# Patient Record
Sex: MALE | Race: WHITE
[De-identification: names, ages, dates, MRNs, and addresses within clinical notes are randomized per-mention and may not be internally consistent; named-entity substitution may affect disease eponyms.]

---

## 2018-03-17 ENCOUNTER — HOSPITAL ENCOUNTER (EMERGENCY)
Dept: HOSPITAL 17 - PHED | Age: 33
LOS: 1 days | Discharge: HOME | End: 2018-03-18
Payer: COMMERCIAL

## 2018-03-17 VITALS
SYSTOLIC BLOOD PRESSURE: 135 MMHG | RESPIRATION RATE: 18 BRPM | OXYGEN SATURATION: 96 % | TEMPERATURE: 98.9 F | DIASTOLIC BLOOD PRESSURE: 86 MMHG | HEART RATE: 120 BPM

## 2018-03-17 VITALS — BODY MASS INDEX: 24.19 KG/M2 | HEIGHT: 72 IN | WEIGHT: 178.57 LBS

## 2018-03-17 DIAGNOSIS — S61.211A: Primary | ICD-10-CM
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
## 2018-03-17 PROCEDURE — 12001 RPR S/N/AX/GEN/TRNK 2.5CM/<: CPT

## 2018-03-18 VITALS — RESPIRATION RATE: 16 BRPM | OXYGEN SATURATION: 95 % | HEART RATE: 90 BPM

## 2018-03-18 VITALS — DIASTOLIC BLOOD PRESSURE: 80 MMHG | SYSTOLIC BLOOD PRESSURE: 130 MMHG

## 2018-03-18 NOTE — PD
HPI


Chief Complaint:  Laceration/Skin Injury


Time Seen by Provider:  01:03


Travel History


International Travel<30 days:  No


Contact w/Intl Traveler<30days:  No


Traveled to known affect area:  No





History of Present Illness


HPI


32-year-old male presents to the emergency department by private transportation 

for injury to his left index finger.  Patient is left-handed.  Patient states 

tetanus status is current.  Patient states just prior to arrival to the 

emergency department within the last 3 hours while using a Brillo pad at work 

to clean the table he was cut by the metal on the Brillo pad sustaining a 

laceration to the proximal left index finger just distal to the second MTP on 

the palmar aspect.  Bleeding is controlled.  Patient denies any numbness 

tingling or weakness of the digit denies any tendon injury.  Patient has intact 

flexion and extension.  The patient rates his pain 0/10 intensity.





PFSH


Past Medical History


*** Narrative Medical


Negative past medical history negative surgical history occasional alcohol use; 

nursing notes reviewed


Medical History:  Denies Significant Hx


Tetanus Vaccination:  < 5 Years


Influenza Vaccination:  Yes





Past Surgical History


Surgical History:  No Previous Surgery





Social History


Alcohol Use:  Yes


Tobacco Use:  No


Substance Use:  No





Allergies-Medications


(Allergen,Severity, Reaction):  


Coded Allergies:  


     No Known Allergies (Unverified  Adverse Reaction, Unknown, 3/18/18)


Reported Meds & Prescriptions





Reported Meds & Active Scripts


Active


No Active Prescriptions or Reported Medications    








Review of Systems


Except as stated in HPI:  all other systems reviewed are Neg





Physical Exam


Narrative


General: Well-developed well-nourished male no acute distress or respiratory 

distress


Extremity attention left hand palmar aspect just distal to the flexor surface 

of the second MCP capillary refill less than 2 seconds sensation intact digit 

is neurovascular tendon intact with intact flexion and extension at the DIP PIP 

and MCP.





Data


Data


Last Documented VS





Vital Signs








  Date Time  Temp Pulse Resp B/P (MAP) Pulse Ox O2 Delivery O2 Flow Rate FiO2


 


3/18/18 01:59  90 16 130/80 (97) 97   


 


3/18/18 00:54      Room Air  


 


3/17/18 23:30 98.9       








Orders





 Orders


Lidocaine Pf 1% Inj (Xylocaine-Mpf 1% In (3/18/18 01:15)


Ed Discharge Order (3/18/18 01:57)








MDM


Medical Decision Making


Medical Screen Exam Complete:  Yes


Emergency Medical Condition:  Yes


Medical Record Reviewed:  Yes


Differential Diagnosis


Laceration, neurovascular tendon injury, retained foreign body


Narrative Course


Laceration repair was performed sterile dressing applied; patient tolerated 

repair well.





Procedures


**Procedure Narrative**


LACERATION


LOCATION: left index finger


LENGTH: 2 cm


NUMBER OF STITCHES/STAPLES: 3





REPAIR: The area of the laceration was prepped with Betadine and sterilely 

draped.  The laceration was infiltrated with 1% lidocaine plain.  The wound was 

copiously irrigated and explored without evidence of foreign body, tendon 

injury or neurovascular injury.  The wound was closed using 5-0 nylon. This was 

a single layer repair. A sterile dressing was applied. The patient was advised 

to keep the dressing clean and dry. Patient tolerated the procedure well.  

Tetanus status current less than 1 year.





Diagnosis





 Primary Impression:  


 Laceration of left index finger


 Qualified Codes:  S61.211A - Laceration without foreign body of left index 

finger without damage to nail, initial encounter


Referrals:  


Primary Care Physician


2 days


Patient Instructions:  General Instructions





***Additional Instructions:  


Keep wound site clean and dry


Wound check at 2 days suture removal at 7-10 days


Apply topical antibiotic


Return to the emergency department for any concerns or change in condition


Follow-up with your primary care provider


May take acetaminophen or ibuprofen per package directions as needed for 

discomfort or for fever 100.4F or greater


Scripts


No Active Prescriptions or Reported Meds


Disposition:  01 DISCHARGE HOME


Condition:  Stable











Liz Dior MD Mar 18, 2018 01:07